# Patient Record
Sex: FEMALE | URBAN - METROPOLITAN AREA
[De-identification: names, ages, dates, MRNs, and addresses within clinical notes are randomized per-mention and may not be internally consistent; named-entity substitution may affect disease eponyms.]

---

## 2023-06-08 ENCOUNTER — APPOINTMENT (RX ONLY)
Dept: URBAN - METROPOLITAN AREA CLINIC 308 | Facility: CLINIC | Age: 43
Setting detail: DERMATOLOGY
End: 2023-06-08

## 2023-06-08 DIAGNOSIS — L30.9 DERMATITIS, UNSPECIFIED: ICD-10-CM

## 2023-06-08 PROCEDURE — 99203 OFFICE O/P NEW LOW 30 MIN: CPT

## 2023-06-08 PROCEDURE — ? PRESCRIPTION MEDICATION MANAGEMENT

## 2023-06-08 PROCEDURE — ? PRESCRIPTION

## 2023-06-08 PROCEDURE — ? COUNSELING

## 2023-06-08 RX ORDER — CLOBETASOL PROPIONATE 0.5 MG/G
OINTMENT TOPICAL
Qty: 60 | Refills: 3 | Status: ERX | COMMUNITY
Start: 2023-06-08

## 2023-06-08 RX ADMIN — CLOBETASOL PROPIONATE: 0.5 OINTMENT TOPICAL at 00:00

## 2023-06-08 ASSESSMENT — LOCATION ZONE DERM
LOCATION ZONE: TRUNK
LOCATION ZONE: LEG

## 2023-06-08 ASSESSMENT — LOCATION SIMPLE DESCRIPTION DERM
LOCATION SIMPLE: LEFT THIGH
LOCATION SIMPLE: GROIN
LOCATION SIMPLE: LEFT KNEE
LOCATION SIMPLE: RIGHT UPPER BACK

## 2023-06-08 ASSESSMENT — LOCATION DETAILED DESCRIPTION DERM
LOCATION DETAILED: LEFT SUPRAPUBIC SKIN
LOCATION DETAILED: RIGHT MEDIAL UPPER BACK
LOCATION DETAILED: LEFT KNEE
LOCATION DETAILED: LEFT ANTERIOR PROXIMAL THIGH

## 2023-06-08 NOTE — PROCEDURE: PRESCRIPTION MEDICATION MANAGEMENT
Plan: Offered to send in Hydroxyzine for itching but pt says she has tried this medication before although I’m unable to find it in her records. Informed pt and her daughter that thyroid disease, liver or kidney disease can manifest as itching. Her daughter says that she had bloodwork done in February and “everything was normal.”\\n\\nAfter the visit, I was able to pull up labs on [facility] and saw that she had BMP, CBC, and LFT’s drawn on 2/19/23\\n\\nHepatic panel shows mildly elevated Alk phos at 162 (ref range  U/L) and ALT at 38 (ref range <35) but her CBC was normal and BMP show normal BUN/Cr and GFR. I do not see that her thyroid has been tested but she may have had this done through her PCP at [facility]. Will see if we can request these records. Of note, patient is Faroese-speaking and her daughter accompanied her today to provide translation services.\\n\\nRecommend follow up in 2 weeks and will likely perform punch biopsy to r/o SCLE Plan: Offered to send in Hydroxyzine for itching but pt says she has tried this medication before although I’m unable to find it in her records. Informed pt and her daughter that thyroid disease, liver or kidney disease can manifest as itching. Her daughter says that she had bloodwork done in February and “everything was normal.”\\n\\nAfter the visit, I was able to pull up labs on [facility] and saw that she had BMP, CBC, and LFT’s drawn on 2/19/23\\n\\nHepatic panel shows mildly elevated Alk phos at 162 (ref range  U/L) and ALT at 38 (ref range <35) but her CBC was normal and BMP show normal BUN/Cr and GFR. I do not see that her thyroid has been tested but she may have had this done through her PCP at [facility]. Will see if we can request these records. Of note, patient is Maltese-speaking and her daughter accompanied her today to provide translation services.\\n\\nRecommend follow up in 2 weeks and will likely perform punch biopsy to r/o SCLE

## 2023-06-08 NOTE — HPI: RASH
What Type Of Note Output Would You Prefer (Optional)?: Bullet Format
Is This A New Presentation, Or A Follow-Up?: Rash
Additional History: Pt referred to our office for pruritic rash for the past 4 months that started along her trunk but seems to be spreading to her scalp and other areas of the body. 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 She has tried Triamcinolone and hydrocortisone which help minimally but the itching always returns. She also has T2DM. Denies any new medications or dose increases prior to rash/itching onset. She has also tried OTC antihistamines with minimal success. The itching is always the worst at night, 10/10 itching

## 2023-06-27 ENCOUNTER — APPOINTMENT (RX ONLY)
Dept: URBAN - METROPOLITAN AREA CLINIC 308 | Facility: CLINIC | Age: 43
Setting detail: DERMATOLOGY
End: 2023-06-27

## 2023-06-27 DIAGNOSIS — L30.9 DERMATITIS, UNSPECIFIED: ICD-10-CM

## 2023-06-27 PROCEDURE — 11104 PUNCH BX SKIN SINGLE LESION: CPT

## 2023-06-27 PROCEDURE — ? BIOPSY BY PUNCH METHOD

## 2023-06-27 PROCEDURE — ? PRESCRIPTION MEDICATION MANAGEMENT

## 2023-06-27 ASSESSMENT — LOCATION ZONE DERM: LOCATION ZONE: ARM

## 2023-06-27 ASSESSMENT — LOCATION DETAILED DESCRIPTION DERM: LOCATION DETAILED: LEFT VENTRAL DISTAL FOREARM

## 2023-06-27 ASSESSMENT — LOCATION SIMPLE DESCRIPTION DERM: LOCATION SIMPLE: LEFT FOREARM

## 2023-06-27 NOTE — PROCEDURE: BIOPSY BY PUNCH METHOD
Detail Level: Detailed
Was A Bandage Applied: Yes
Punch Size In Mm: 4
Size Of Lesion In Cm (Optional): 0
Depth Of Punch Biopsy: dermis
Biopsy Type: H and E
Anesthesia Type: 1% lidocaine with epinephrine
Anesthesia Volume In Cc (Will Not Render If 0): 0.5
Hemostasis: None
Epidermal Sutures: 4-0 Ethilon
Number Of Epidermal Sutures (Optional): 2
Wound Care: Petrolatum
Dressing: pressure dressing
Suture Removal: 7 days
Patient Will Remove Sutures At Home?: No
Lab: 6
Consent: Verbal consent was obtained and risks were reviewed including but not limited to scarring, infection, bleeding, scabbing, incomplete removal, and allergy to anesthesia.
Post-Care Instructions: I reviewed with the patient in detail post-care instructions. Patient is to keep the biopsy site dry overnight, and then apply bacitracin twice daily until healed. Patient may apply hydrogen peroxide soaks to remove any crusting.
Home Suture Removal Text: Patient was provided a home suture removal kit and will remove their sutures at home.  If they have any questions or difficulties they will call the office.
Notification Instructions: Patient will be notified of biopsy results. However, patient instructed to call the office if not contacted within 2 weeks.
Billing Type: Third-Party Bill
Information: Selecting Yes will display possible errors in your note based on the variables you have selected. This validation is only offered as a suggestion for you. PLEASE NOTE THAT THE VALIDATION TEXT WILL BE REMOVED WHEN YOU FINALIZE YOUR NOTE. IF YOU WANT TO FAX A PRELIMINARY NOTE YOU WILL NEED TO TOGGLE THIS TO 'NO' IF YOU DO NOT WANT IT IN YOUR FAXED NOTE.

## 2023-06-27 NOTE — PROCEDURE: PRESCRIPTION MEDICATION MANAGEMENT
Detail Level: Simple
Continue Regimen: Clobetasol 0.05% ointment BID to affected areas x up to 2 weeks, PRN flares only
Render In Strict Bullet Format?: No
Plan: Clobetasol helps with itching/rash temporarily but she always becomes itchy again. Pt tried taking cetirizine (Zyrtec) up to 4 tabs for itching but it just made her sleepy at higher doses and did not have any relief from itching.\\n\\nInterestingly, pt notes that when she is with her partner they are both itchy with similar rash, however, pt’s other household contacts (daughter and granddaughter) do not have any similar symptoms. It sounds like pt and her partner do not live together, but they raised question about the possibility of insect bites. \\n\\nLabs were done by PCP in Feb/March of this year. Hepatic panel showed mildly elevated Alk phos at 162 (ref range  U/L) and ALT at 38 (ref range <35). CBC normal and BMP unremarkable. TSH WNL as well (labs scanned under attachments) \\n\\nPunch biopsy performed today to r/o SCLE vs insect bite vs other.

## 2023-07-06 ENCOUNTER — APPOINTMENT (RX ONLY)
Dept: URBAN - METROPOLITAN AREA CLINIC 308 | Facility: CLINIC | Age: 43
Setting detail: DERMATOLOGY
End: 2023-07-06

## 2023-07-06 DIAGNOSIS — B86 SCABIES: ICD-10-CM

## 2023-07-06 PROBLEM — L30.9 DERMATITIS, UNSPECIFIED: Status: ACTIVE | Noted: 2023-07-06

## 2023-07-06 PROCEDURE — ? COUNSELING

## 2023-07-06 PROCEDURE — ? PRESCRIPTION

## 2023-07-06 PROCEDURE — 99213 OFFICE O/P EST LOW 20 MIN: CPT

## 2023-07-06 PROCEDURE — ? PRESCRIPTION MEDICATION MANAGEMENT

## 2023-07-06 PROCEDURE — ? PATHOLOGY DISCUSSION

## 2023-07-06 RX ORDER — PERMETHRIN 50 MG/G
CREAM TOPICAL QD
Qty: 60 | Refills: 2 | Status: ERX | COMMUNITY
Start: 2023-07-06

## 2023-07-06 RX ADMIN — PERMETHRIN: 50 CREAM TOPICAL at 00:00

## 2023-07-06 ASSESSMENT — LOCATION DETAILED DESCRIPTION DERM
LOCATION DETAILED: LEFT VENTRAL PROXIMAL FOREARM
LOCATION DETAILED: RIGHT SUPERIOR MEDIAL UPPER BACK

## 2023-07-06 ASSESSMENT — LOCATION ZONE DERM
LOCATION ZONE: TRUNK
LOCATION ZONE: ARM

## 2023-07-06 ASSESSMENT — LOCATION SIMPLE DESCRIPTION DERM
LOCATION SIMPLE: RIGHT UPPER BACK
LOCATION SIMPLE: LEFT FOREARM

## 2023-07-06 NOTE — PROCEDURE: PRESCRIPTION MEDICATION MANAGEMENT
Detail Level: Simple
Initiate Treatment: permethrin 5 % topical cream QD\\nApply to all areas of body from neck down to feet. Leave on for 8-14 hours then wash off with soap and water. Repeat in 1 week
Render In Strict Bullet Format?: No

## 2023-07-17 ENCOUNTER — APPOINTMENT (RX ONLY)
Dept: URBAN - METROPOLITAN AREA CLINIC 308 | Facility: CLINIC | Age: 43
Setting detail: DERMATOLOGY
End: 2023-07-17

## 2023-07-17 DIAGNOSIS — L30.8 OTHER SPECIFIED DERMATITIS: ICD-10-CM | Status: INADEQUATELY CONTROLLED

## 2023-07-17 DIAGNOSIS — L20.89 OTHER ATOPIC DERMATITIS: ICD-10-CM

## 2023-07-17 PROCEDURE — ? COUNSELING

## 2023-07-17 PROCEDURE — ? ORDER TESTS

## 2023-07-17 PROCEDURE — 99214 OFFICE O/P EST MOD 30 MIN: CPT | Mod: 25

## 2023-07-17 PROCEDURE — ? PRESCRIPTION

## 2023-07-17 PROCEDURE — ? ADDITIONAL NOTES

## 2023-07-17 PROCEDURE — 11104 PUNCH BX SKIN SINGLE LESION: CPT

## 2023-07-17 PROCEDURE — ? BIOPSY BY PUNCH METHOD

## 2023-07-17 RX ORDER — HYDROCORTISONE 25 MG/G
OINTMENT TOPICAL
Qty: 28.35 | Refills: 0 | Status: ERX | COMMUNITY
Start: 2023-07-17

## 2023-07-17 RX ORDER — MYCOPHENOLATE MOFETIL 250 MG/1
CAPSULE ORAL
Qty: 240 | Refills: 3 | Status: ERX | COMMUNITY
Start: 2023-07-17

## 2023-07-17 RX ORDER — CLOBETASOL PROPIONATE 0.5 MG/G
OINTMENT TOPICAL
Qty: 60 | Refills: 1 | Status: ERX

## 2023-07-17 RX ADMIN — MYCOPHENOLATE MOFETIL: 250 CAPSULE ORAL at 00:00

## 2023-07-17 RX ADMIN — HYDROCORTISONE: 25 OINTMENT TOPICAL at 00:00

## 2023-07-17 ASSESSMENT — LOCATION SIMPLE DESCRIPTION DERM
LOCATION SIMPLE: RIGHT UPPER BACK
LOCATION SIMPLE: ABDOMEN
LOCATION SIMPLE: LEFT UPPER BACK

## 2023-07-17 ASSESSMENT — ITCH NUMERIC RATING SCALE: HOW SEVERE IS YOUR ITCHING?: 10

## 2023-07-17 ASSESSMENT — LOCATION DETAILED DESCRIPTION DERM
LOCATION DETAILED: LEFT MID-UPPER BACK
LOCATION DETAILED: RIGHT MEDIAL UPPER BACK
LOCATION DETAILED: EPIGASTRIC SKIN

## 2023-07-17 ASSESSMENT — LOCATION ZONE DERM: LOCATION ZONE: TRUNK

## 2023-07-17 NOTE — PROCEDURE: ADDITIONAL NOTES
Render Risk Assessment In Note?: yes
Additional Notes: Daughter served as the  for the patient over the phone.  Patient has been struggling with this rash x 2 months. Initially started on her abdomen and has spread to her back, chest, face.  Rash is described as itchy but not is also painful.  She has tried and failed hydrocortisone, triamcinolone, clobetasol.  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  Interestingly, patient's partner also has a similar rash.  There was suspicion of scabies given multiple household members have this however, patient has been using permethrin x 1 month and does not note improvement.  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  Biopsy taken per below.  Hesitatnt to use prednisone in the setting of T2DM and blood sugars difficult to control the last time the patient was on prednisone.  Discussed starting treatment for dermal hypersensitivity with cellcept 250 mg po working up to 1000 mg po bid as tolerated by adverse effects of diarrhea and cramping.  Labs per above.  RTC in 2 weeks for follow-up and suture removal.
Detail Level: Simple

## 2023-07-17 NOTE — PROCEDURE: ORDER TESTS
Expected Date Of Service: 07/17/2023
Performing Laboratory: 0
Bill For Surgical Tray: no
Billing Type: Third-Party Bill

## 2023-07-17 NOTE — PROCEDURE: BIOPSY BY PUNCH METHOD
Detail Level: Detailed
Was A Bandage Applied: Yes
Punch Size In Mm: 4
Size Of Lesion In Cm (Optional): 0
Depth Of Punch Biopsy: dermis
Biopsy Type: H and E
Anesthesia Type: 1% lidocaine with epinephrine
Anesthesia Volume In Cc (Will Not Render If 0): 1
Hemostasis: None
Epidermal Sutures: 4-0 Prolene
Wound Care: Petrolatum
Dressing: pressure dressing
Suture Removal: 10 days
Patient Will Remove Sutures At Home?: No
Path Notes (To The Dermatopathologist): Please look at pictures in [facility].
Consent: Verbal consent was obtained and risks were reviewed including but not limited to scarring, infection, bleeding, scabbing, incomplete removal, nerve damage and allergy to anesthesia.
Post-Care Instructions: I reviewed with the patient in detail post-care instructions. Patient is to keep the biopsy site dry overnight, and then apply bacitracin twice daily until healed. Patient may apply hydrogen peroxide soaks to remove any crusting.
Home Suture Removal Text: Patient was provided a home suture removal kit and will remove their sutures at home.  If they have any questions or difficulties they will call the office.
Notification Instructions: Patient will be notified of biopsy results. However, patient instructed to call the office if not contacted within 2 weeks.
Billing Type: Third-Party Bill
Information: Selecting Yes will display possible errors in your note based on the variables you have selected. This validation is only offered as a suggestion for you. PLEASE NOTE THAT THE VALIDATION TEXT WILL BE REMOVED WHEN YOU FINALIZE YOUR NOTE. IF YOU WANT TO FAX A PRELIMINARY NOTE YOU WILL NEED TO TOGGLE THIS TO 'NO' IF YOU DO NOT WANT IT IN YOUR FAXED NOTE.

## 2023-08-01 ENCOUNTER — APPOINTMENT (RX ONLY)
Dept: URBAN - METROPOLITAN AREA CLINIC 308 | Facility: CLINIC | Age: 43
Setting detail: DERMATOLOGY
End: 2023-08-01

## 2023-08-01 DIAGNOSIS — Z48.02 ENCOUNTER FOR REMOVAL OF SUTURES: ICD-10-CM

## 2023-08-01 PROCEDURE — ? SUTURE REMOVAL

## 2023-08-01 ASSESSMENT — LOCATION DETAILED DESCRIPTION DERM: LOCATION DETAILED: LEFT MID-UPPER BACK

## 2023-08-01 ASSESSMENT — LOCATION SIMPLE DESCRIPTION DERM: LOCATION SIMPLE: LEFT UPPER BACK

## 2023-08-01 ASSESSMENT — LOCATION ZONE DERM: LOCATION ZONE: TRUNK

## 2023-08-15 ENCOUNTER — APPOINTMENT (RX ONLY)
Dept: URBAN - METROPOLITAN AREA CLINIC 308 | Facility: CLINIC | Age: 43
Setting detail: DERMATOLOGY
End: 2023-08-15

## 2023-08-15 DIAGNOSIS — L81.5 LEUKODERMA, NOT ELSEWHERE CLASSIFIED: ICD-10-CM

## 2023-08-15 DIAGNOSIS — L20.89 OTHER ATOPIC DERMATITIS: ICD-10-CM | Status: INADEQUATELY CONTROLLED

## 2023-08-15 PROCEDURE — ? ADDITIONAL NOTES

## 2023-08-15 PROCEDURE — ? PRESCRIPTION

## 2023-08-15 PROCEDURE — ? COUNSELING

## 2023-08-15 PROCEDURE — 99214 OFFICE O/P EST MOD 30 MIN: CPT

## 2023-08-15 RX ORDER — CLOBETASOL PROPIONATE 0.5 MG/G
OINTMENT TOPICAL
Qty: 60 | Refills: 0 | Status: ERX

## 2023-08-15 ASSESSMENT — LOCATION SIMPLE DESCRIPTION DERM
LOCATION SIMPLE: RIGHT SHOULDER
LOCATION SIMPLE: LEFT SHOULDER
LOCATION SIMPLE: RIGHT THIGH
LOCATION SIMPLE: LEFT THIGH
LOCATION SIMPLE: RIGHT FOREARM
LOCATION SIMPLE: LEFT POSTERIOR THIGH
LOCATION SIMPLE: RIGHT KNEE
LOCATION SIMPLE: ABDOMEN
LOCATION SIMPLE: UPPER BACK
LOCATION SIMPLE: LEFT FOREARM
LOCATION SIMPLE: LEFT KNEE
LOCATION SIMPLE: RIGHT POSTERIOR THIGH

## 2023-08-15 ASSESSMENT — LOCATION DETAILED DESCRIPTION DERM
LOCATION DETAILED: LEFT KNEE
LOCATION DETAILED: RIGHT POSTERIOR SHOULDER
LOCATION DETAILED: RIGHT KNEE
LOCATION DETAILED: EPIGASTRIC SKIN
LOCATION DETAILED: LEFT ANTERIOR DISTAL THIGH
LOCATION DETAILED: LEFT PROXIMAL DORSAL FOREARM
LOCATION DETAILED: RIGHT ANTERIOR DISTAL THIGH
LOCATION DETAILED: INFERIOR THORACIC SPINE
LOCATION DETAILED: RIGHT PROXIMAL DORSAL FOREARM
LOCATION DETAILED: LEFT PROXIMAL POSTERIOR THIGH
LOCATION DETAILED: LEFT POSTERIOR SHOULDER
LOCATION DETAILED: RIGHT PROXIMAL POSTERIOR THIGH

## 2023-08-15 ASSESSMENT — ITCH NUMERIC RATING SCALE: HOW SEVERE IS YOUR ITCHING?: 4

## 2023-08-15 ASSESSMENT — LOCATION ZONE DERM
LOCATION ZONE: TRUNK
LOCATION ZONE: LEG
LOCATION ZONE: ARM

## 2023-08-15 NOTE — PROCEDURE: ADDITIONAL NOTES
Additional Notes: Daughter present at visit and provided translation for the patient as she is Occitan speaking.  Biopsy proven at last visit.  Patient's skin is completely clear today.  Discussed biopsy results in depth.  Patient feels her pruritus has decreased from a 10/10 to a 4/10.  She is using clobetasol topically prn breakthrough pruritus.  Advised the patient to not use the topical steroid in the same place for more than 2 weeks straight to prevent atrophy.  Recommended patient use ice prior to topical steroids to see if this helps relieve her pruritus.  \\n\\nStarted Cellcept as the patient's first biopsy resulted as dermal hypersensitivity. \\n Opted to continue Cellcept since she has noted improvement with plans to ideally taper off once symptoms are better controlled. Patient is currently on Cellcept 250 mg po bid.  She states when she increased to 500 mg po bid, she experienced back pain which resolved with dropping the dose.  Patient is agreeable to trying to increase the dose again to 1000 mg po bid.  Discussed this medication can take several months to work.  Continue topical steroids for breakthrough.  RTC in 6-8 weeks, will recheck labs at that time. Additional Notes: Daughter present at visit and provided translation for the patient as she is Ukrainian speaking.  Biopsy proven at last visit.  Patient's skin is completely clear today.  Discussed biopsy results in depth.  Patient feels her pruritus has decreased from a 10/10 to a 4/10.  She is using clobetasol topically prn breakthrough pruritus.  Advised the patient to not use the topical steroid in the same place for more than 2 weeks straight to prevent atrophy.  Recommended patient use ice prior to topical steroids to see if this helps relieve her pruritus.  \\n\\nStarted Cellcept as the patient's first biopsy resulted as dermal hypersensitivity. \\n Opted to continue Cellcept since she has noted improvement with plans to ideally taper off once symptoms are better controlled. Patient is currently on Cellcept 250 mg po bid.  She states when she increased to 500 mg po bid, she experienced back pain which resolved with dropping the dose.  Patient is agreeable to trying to increase the dose again to 1000 mg po bid.  Discussed this medication can take several months to work.  Continue topical steroids for breakthrough.  RTC in 6-8 weeks, will recheck labs at that time.

## 2023-09-26 ENCOUNTER — APPOINTMENT (RX ONLY)
Dept: URBAN - METROPOLITAN AREA CLINIC 308 | Facility: CLINIC | Age: 43
Setting detail: DERMATOLOGY
End: 2023-09-26

## 2023-09-26 DIAGNOSIS — L20.89 OTHER ATOPIC DERMATITIS: ICD-10-CM

## 2023-09-26 DIAGNOSIS — L73.8 OTHER SPECIFIED FOLLICULAR DISORDERS: ICD-10-CM

## 2023-09-26 PROCEDURE — 99214 OFFICE O/P EST MOD 30 MIN: CPT

## 2023-09-26 PROCEDURE — ? PRESCRIPTION

## 2023-09-26 PROCEDURE — ? ADDITIONAL NOTES

## 2023-09-26 PROCEDURE — ? COUNSELING

## 2023-09-26 PROCEDURE — ? ORDER TESTS

## 2023-09-26 RX ORDER — TRETIONIN 0.25 MG/G
CREAM TOPICAL
Qty: 45 | Refills: 3 | Status: ERX | COMMUNITY
Start: 2023-09-26

## 2023-09-26 RX ORDER — MYCOPHENOLATE MOFETIL 500 MG/1
TABLET, FILM COATED ORAL
Qty: 120 | Refills: 3 | Status: ERX | COMMUNITY
Start: 2023-09-26

## 2023-09-26 RX ADMIN — MYCOPHENOLATE MOFETIL: 500 TABLET, FILM COATED ORAL at 00:00

## 2023-09-26 RX ADMIN — TRETIONIN: 0.25 CREAM TOPICAL at 00:00

## 2023-09-26 ASSESSMENT — LOCATION SIMPLE DESCRIPTION DERM
LOCATION SIMPLE: LEFT POSTERIOR THIGH
LOCATION SIMPLE: ABDOMEN
LOCATION SIMPLE: RIGHT THIGH
LOCATION SIMPLE: RIGHT SHOULDER
LOCATION SIMPLE: UPPER BACK
LOCATION SIMPLE: LEFT THIGH
LOCATION SIMPLE: LEFT KNEE
LOCATION SIMPLE: LEFT SHOULDER
LOCATION SIMPLE: RIGHT POSTERIOR THIGH
LOCATION SIMPLE: RIGHT KNEE
LOCATION SIMPLE: LEFT EYEBROW
LOCATION SIMPLE: RIGHT EYEBROW

## 2023-09-26 ASSESSMENT — LOCATION DETAILED DESCRIPTION DERM
LOCATION DETAILED: RIGHT CENTRAL EYEBROW
LOCATION DETAILED: LEFT ANTERIOR DISTAL THIGH
LOCATION DETAILED: LEFT KNEE
LOCATION DETAILED: RIGHT KNEE
LOCATION DETAILED: RIGHT POSTERIOR SHOULDER
LOCATION DETAILED: RIGHT PROXIMAL POSTERIOR THIGH
LOCATION DETAILED: LEFT POSTERIOR SHOULDER
LOCATION DETAILED: LEFT PROXIMAL POSTERIOR THIGH
LOCATION DETAILED: LEFT CENTRAL EYEBROW
LOCATION DETAILED: INFERIOR THORACIC SPINE
LOCATION DETAILED: EPIGASTRIC SKIN
LOCATION DETAILED: RIGHT ANTERIOR DISTAL THIGH

## 2023-09-26 ASSESSMENT — LOCATION ZONE DERM
LOCATION ZONE: LEG
LOCATION ZONE: ARM
LOCATION ZONE: FACE
LOCATION ZONE: TRUNK

## 2023-09-26 NOTE — PROCEDURE: ADDITIONAL NOTES
Additional Notes: Discussed treatment options including electrocautery vs tretinoin, pt opted to try topicals. Recommended starting tretinoin 0.025% topically to full face 2-3 times a week working up slowly to nightly as tolerated.

## 2023-09-26 NOTE — PROCEDURE: ADDITIONAL NOTES
Additional Notes: Daughter present at visit and provided translation for the patient as patient is Slovenian speaking. Patient's skin is completely clear today.  Patient now rates her pruritus a 3/10 ( was previously as high as an 8/10).  She states her pruritus occurs without rash and is worse in the evening.  Discussed continued ddx includes dermal hypersensitivity vs spongiotic dermatitis vs urticarial BP.  With improvement of pruritus with the cellcept and continued lack of rash, opted to continue cellcept 1000 mg po bid per Dr. Elmore' recommendations.  RTC in 3 months.  If continues to be stable, hopeful to taper the patient off cellcept at that time.  Labs per above. Additional Notes: Daughter present at visit and provided translation for the patient as patient is Setswana speaking. Patient's skin is completely clear today.  Patient now rates her pruritus a 3/10 ( was previously as high as an 8/10).  She states her pruritus occurs without rash and is worse in the evening.  Discussed continued ddx includes dermal hypersensitivity vs spongiotic dermatitis vs urticarial BP.  With improvement of pruritus with the cellcept and continued lack of rash, opted to continue cellcept 1000 mg po bid per Dr. Elmore' recommendations.  RTC in 3 months.  If continues to be stable, hopeful to taper the patient off cellcept at that time.  Labs per above.

## 2023-09-26 NOTE — PROCEDURE: ORDER TESTS
Expected Date Of Service: 09/26/2023
Bill For Surgical Tray: no
Billing Type: Third-Party Bill
Performing Laboratory: 0